# Patient Record
Sex: FEMALE | Race: WHITE | NOT HISPANIC OR LATINO
[De-identification: names, ages, dates, MRNs, and addresses within clinical notes are randomized per-mention and may not be internally consistent; named-entity substitution may affect disease eponyms.]

---

## 2019-01-31 PROBLEM — Z00.00 ENCOUNTER FOR PREVENTIVE HEALTH EXAMINATION: Status: ACTIVE | Noted: 2019-01-31

## 2019-02-08 ENCOUNTER — APPOINTMENT (OUTPATIENT)
Dept: UROGYNECOLOGY | Facility: CLINIC | Age: 69
End: 2019-02-08
Payer: MEDICARE

## 2019-02-08 VITALS
BODY MASS INDEX: 30.02 KG/M2 | DIASTOLIC BLOOD PRESSURE: 74 MMHG | SYSTOLIC BLOOD PRESSURE: 120 MMHG | HEIGHT: 61 IN | WEIGHT: 159 LBS

## 2019-02-08 DIAGNOSIS — Z78.9 OTHER SPECIFIED HEALTH STATUS: ICD-10-CM

## 2019-02-08 DIAGNOSIS — K59.00 CONSTIPATION, UNSPECIFIED: ICD-10-CM

## 2019-02-08 DIAGNOSIS — R35.1 NOCTURIA: ICD-10-CM

## 2019-02-08 DIAGNOSIS — R35.0 FREQUENCY OF MICTURITION: ICD-10-CM

## 2019-02-08 DIAGNOSIS — Z84.1 FAMILY HISTORY OF DISORDERS OF KIDNEY AND URETER: ICD-10-CM

## 2019-02-08 DIAGNOSIS — Z85.42 PERSONAL HISTORY OF MALIGNANT NEOPLASM OF OTHER PARTS OF UTERUS: ICD-10-CM

## 2019-02-08 DIAGNOSIS — R39.15 URGENCY OF URINATION: ICD-10-CM

## 2019-02-08 LAB
BILIRUB UR QL STRIP: NORMAL
CLARITY UR: CLEAR
COLLECTION METHOD: NORMAL
GLUCOSE UR-MCNC: NORMAL
HCG UR QL: 0.2 EU/DL
HGB UR QL STRIP.AUTO: NORMAL
KETONES UR-MCNC: NORMAL
LEUKOCYTE ESTERASE UR QL STRIP: NORMAL
NITRITE UR QL STRIP: NORMAL
PH UR STRIP: 7
PROT UR STRIP-MCNC: NORMAL
SP GR UR STRIP: 1.01

## 2019-02-08 PROCEDURE — 99204 OFFICE O/P NEW MOD 45 MIN: CPT | Mod: 25

## 2019-02-08 PROCEDURE — 51725 SIMPLE CYSTOMETROGRAM: CPT

## 2019-02-08 PROCEDURE — 51741 ELECTRO-UROFLOWMETRY FIRST: CPT

## 2019-02-08 NOTE — DISCUSSION/SUMMARY
[Reviewed Clinical Lab Test(s)] : Results of clinical tests were reviewed. [Discuss Alternatives/Risks/Benefits w/Patient] : All alternatives, risks, and benefits were discussed with the patient/family and all questions were answered.  Patient expressed good understanding and appreciates the importance of follow up as recommended. [FreeTextEntry1] : 67yo with OAB/DO.\par \par Discussed diagnosis and exam/test findings with the patient and her daughter. Discussed management options including observation, behavioral and lifestyle modifications, medications, PTNS, Interstim, and Bladder Botox. Counseled patient specifically on fluid modifications, eliminating tea before bedtime, and not drinking after 6pm (sleeps at 9pm). She wishes to try these modifications. She will return in 5-6 weeks for follow up and would consider a medication at that time if no improvement. She was given a handout in [place] on OAB. She and her daughter expressed understanding. All questions were answered.

## 2019-02-08 NOTE — HISTORY OF PRESENT ILLNESS
[Cystocele (Obstetric)] : none [Vaginal Wall Prolapse] : none [Rectal Prolapse] : none [Stress Incontinence] : none [Unable To Restrain Bowel Movement] : rare [Urinary Frequency] : none [Feelings Of Urinary Urgency] : daily [x3+] : three or more  times a night [Urinary Tract Infection] : daily [Constipation Obstructed Defecation] : rare [] : years ago [Stool Visible Blood] : none [Incomplete Emptying Of Stool] : none [Sexual Dysfunction, NOS] : none [de-identified] : when waiting for restroom in public [de-identified] : 10x [de-identified] : 4-5x/night [FreeTextEntry6] : drinks tea to help with BMs [FreeTextEntry1] : \par 67yo with symptoms of urgency, frequency, and nocturia. \par \par PMH: endometrial cancer, OA\par PSH: appendectomy, NANI with unilateral oophorectomy (unsure which ovary), abdominal myomectomy, CSx1\par Meds: Celebrex\par \par Intake:\par 4-5 16oz bottles H2O/day\par 8oz coffee in AM\par 8oz tea before bed\par Has water bottle beside bed and drink when she wakes up to use the restroom

## 2019-02-08 NOTE — CHIEF COMPLAINT
[Questionnaire Received] : Patient questionnaire received [Urine Frequency] : urine frequency [Poor Bladder Control] : poor bladder control

## 2019-02-08 NOTE — REASON FOR VISIT
[Initial Visit ___] : [unfilled] is here today for an initial visit  for [unfilled] [Family Member] : family member [Patient Declined  Services] : - None: Patient declined  services [FreeTextEntry3] : Patient's daughter interpreted

## 2019-02-08 NOTE — PROCEDURE
[First Sensation: _____ ml] : first sensation at [unfilled] ml [Fullness: ____ ml] : fullness at [unfilled] ml [group home ____ml] : group home [unfilled] ml [Involuntary Detrusor Contraction] : involuntary detrusor contraction was observed

## 2019-02-08 NOTE — PHYSICAL EXAM
[No Acute Distress] : in no acute distress [Well developed] : well developed [Well Nourished] : ~L well nourished [Good Hygeine] : demonstrates good hygeine [Normal Memory] : ~T memory was ~L unimpaired [Normal Mood/Affect] : mood and affect are normal [Warm and Dry] : was warm and dry to touch [Normal Gait] : gait was normal [Vulvar Atrophy] : vulvar atrophy [Labia Majora] : were normal [Labia Minora] : were normal [Normal Appearance] : general appearance was normal [Atrophy] : atrophy [No Bleeding] : there was no active vaginal bleeding [Absent] : absent [Uterine Adnexae] : were not tender and not enlarged [Normal] : no abnormalities [Post Void Residual ____ml] : post void residual via catheterization was [unfilled] ml [Exam Deferred] : was deferred [Anxiety] : patient is not anxious [Tenderness] : ~T no ~M abdominal tenderness observed [Distended] : not distended [H/Smegaly] : no hepatosplenomegaly [Inguinal LAD] : no adenopathy was noted in the inguinal lymph nodes [de-identified] : no prolapse

## 2019-03-21 ENCOUNTER — APPOINTMENT (OUTPATIENT)
Dept: UROGYNECOLOGY | Facility: CLINIC | Age: 69
End: 2019-03-21
Payer: MEDICARE

## 2019-03-21 DIAGNOSIS — N32.81 OVERACTIVE BLADDER: ICD-10-CM

## 2019-03-21 PROCEDURE — 99213 OFFICE O/P EST LOW 20 MIN: CPT | Mod: GC

## 2019-03-22 NOTE — DISCUSSION/SUMMARY
[FreeTextEntry1] : 67 yo with resolution of her OAB symptoms s/p behavioral modification. RTO PRN. All questions answered.

## 2019-03-22 NOTE — HISTORY OF PRESENT ILLNESS
[FreeTextEntry1] : 67 yo with urgency, frequency, and nocturia here for follow-up. Reports 80% improvement with behavioral modification (avoiding overhydration). Is very happy with results. Frequency now q2-3 hrs and nocturia 1-2 times. No other complaints at this time.

## 2020-01-13 ENCOUNTER — RESULT REVIEW (OUTPATIENT)
Age: 70
End: 2020-01-13

## 2025-08-19 ENCOUNTER — NON-APPOINTMENT (OUTPATIENT)
Age: 75
End: 2025-08-19

## 2025-08-19 ENCOUNTER — APPOINTMENT (OUTPATIENT)
Dept: OPHTHALMOLOGY | Facility: CLINIC | Age: 75
End: 2025-08-19
Payer: MEDICARE

## 2025-08-19 PROCEDURE — 92004 COMPRE OPH EXAM NEW PT 1/>: CPT

## 2025-08-19 PROCEDURE — 92250 FUNDUS PHOTOGRAPHY W/I&R: CPT
